# Patient Record
Sex: MALE | Race: WHITE | NOT HISPANIC OR LATINO | Employment: FULL TIME | URBAN - METROPOLITAN AREA
[De-identification: names, ages, dates, MRNs, and addresses within clinical notes are randomized per-mention and may not be internally consistent; named-entity substitution may affect disease eponyms.]

---

## 2024-09-10 ENCOUNTER — OFFICE VISIT (OUTPATIENT)
Dept: URGENT CARE | Age: 34
End: 2024-09-10
Payer: COMMERCIAL

## 2024-09-10 VITALS
HEART RATE: 70 BPM | RESPIRATION RATE: 20 BRPM | WEIGHT: 223 LBS | OXYGEN SATURATION: 100 % | BODY MASS INDEX: 31.22 KG/M2 | HEIGHT: 71 IN | SYSTOLIC BLOOD PRESSURE: 135 MMHG | DIASTOLIC BLOOD PRESSURE: 87 MMHG | TEMPERATURE: 98 F

## 2024-09-10 DIAGNOSIS — H66.004 RECURRENT ACUTE SUPPURATIVE OTITIS MEDIA OF RIGHT EAR WITHOUT SPONTANEOUS RUPTURE OF TYMPANIC MEMBRANE: Primary | ICD-10-CM

## 2024-09-10 RX ORDER — AMOXICILLIN AND CLAVULANATE POTASSIUM 875; 125 MG/1; MG/1
1 TABLET, FILM COATED ORAL 2 TIMES DAILY
Qty: 14 TABLET | Refills: 0 | Status: SHIPPED | OUTPATIENT
Start: 2024-09-10 | End: 2024-09-17

## 2024-09-10 ASSESSMENT — PAIN SCALES - GENERAL: PAINLEVEL: 4

## 2024-09-10 NOTE — PROGRESS NOTES
"Subjective   Patient ID: Omari Son is a 33 y.o. male. They present today with a chief complaint of Earache (R Ear Pain. Sharp pain under r side of chin. Facial numbness R side. Stiff/Painful neck R side. Worsened x 2 d.).    History of Present Illness  This is a pleasant 33-year-old male who presented to the emergency room complaining of recurrent right-sided ear ache.  The patient states he was seen here 1 week ago and was treated with an eardrop without any relief.  He states in the past 2 days been experiencing some tingling sensation over the right side of his face extending to the anterior chin area.  He denies any fever or chills.  Denies any hearing loss.  He denies any drainage from his ear.  He denies any sore throat.  Earache         Past Medical History  Allergies as of 09/10/2024 - Reviewed 09/10/2024   Allergen Reaction Noted    Hydrochlorothiazide Other 09/10/2024       (Not in a hospital admission)       No past medical history on file.    No past surgical history on file.     reports that he has quit smoking. His smoking use included cigarettes. He does not have any smokeless tobacco history on file.    Review of Systems  Review of Systems   HENT:  Positive for ear pain.    All other systems reviewed and are negative.                                 Objective    Vitals:    09/10/24 1242   BP: 135/87   BP Location: Left arm   Patient Position: Sitting   BP Cuff Size: Large adult   Pulse: 70   Resp: 20   Temp: 36.7 °C (98 °F)   TempSrc: Oral   SpO2: 100%   Weight: 101 kg (223 lb)   Height: 1.803 m (5' 11\")     No LMP for male patient.    Physical Exam  General: Vitals Noted. No distress. Normocephalic.    HEENT: Left  TM is within normal limits with adequately infection visible landmarks.  right TM appears erythematous and bulging .  No preauricular tenderness.  No drainage.  EOMI, normal conjunctiva, patent nares, Normal OP            Procedures    Point of Care Test & Imaging Results from this " visit  Results for orders placed or performed in visit on 02/27/20   Lipid Panel   Result Value Ref Range    Cholesterol 149 0 - 199 mg/dL    HDL 30.0 (A) mg/dL    Cholesterol/HDL Ratio 5.0     LDL 67 0 - 99 mg/dL    VLDL 52 (H) 0 - 40 mg/dL    Triglycerides 259 (H) 0 - 149 mg/dL    Non HDL Cholesterol 119 mg/dL   Comprehensive Metabolic Panel   Result Value Ref Range    Glucose 83 74 - 99 mg/dL    Sodium 139 136 - 145 mmol/L    Potassium 3.7 3.5 - 5.3 mmol/L    Chloride 100 98 - 107 mmol/L    Bicarbonate 31 21 - 32 mmol/L    Anion Gap 12 10 - 20 mmol/L    Urea Nitrogen 9 6 - 23 mg/dL    Creatinine 1.04 0.50 - 1.30 mg/dL    GLOMERULAR FILTRATION RATE-NON AFRICAN AMERICAN >60 >60 mL/min/1.73m2    GLOMERULAR FILTRATION RATE- >60 >60 mL/min/1.73m2    Calcium 9.6 8.6 - 10.3 mg/dL    Albumin 4.9 3.4 - 5.0 g/dL    Alkaline Phosphatase 53 33 - 120 U/L    Total Protein 7.4 6.4 - 8.2 g/dL    AST 19 9 - 39 U/L    Total Bilirubin 0.6 0.0 - 1.2 mg/dL    ALT (SGPT) 21 10 - 52 U/L   CBC and Auto Differential   Result Value Ref Range    WBC 4.6 4.4 - 11.3 x10E9/L    RBC 4.92 4.50 - 5.90 x10E12/L    Hemoglobin 14.0 13.5 - 17.5 g/dL    Hematocrit 41.5 41.0 - 52.0 %    MCV 84 80 - 100 fL    MCHC 33.7 32.0 - 36.0 g/dL    Platelets 195 150 - 450 x10E9/L    RDW 13.4 11.5 - 14.5 %    Neutrophils % 58.2 40.0 - 80.0 %    Immature Granulocytes %, Automated 0.2 0.0 - 0.9 %    Lymphocytes % 32.5 13.0 - 44.0 %    Monocytes % 6.7 2.0 - 10.0 %    Eosinophils % 2.0 0.0 - 6.0 %    Basophils % 0.4 0.0 - 2.0 %    Neutrophils Absolute 2.68 1.20 - 7.70 x10E9/L    Lymphocytes Absolute 1.50 1.20 - 4.80 x10E9/L    Monocytes Absolute 0.31 0.10 - 1.00 x10E9/L    Eosinophils Absolute 0.09 0.00 - 0.70 x10E9/L    Basophils Absolute 0.02 0.00 - 0.10 x10E9/L     No results found.    Diagnostic study results (if any) were reviewed by Burt Thompson DO.    Assessment/Plan   Allergies, medications, history, and pertinent labs/EKGs/Imaging  reviewed by Burt Thompson DO.     Medical Decision Making  DM: Patient was seen and evaluated the clinic for chief complaint of ear pain.  On exam patient is nontoxic very well-appearing resting comfortably no acute distress.  Vital signs are stable, afebrile.  Chest is clear, heart is regular, belly is soft and nontender.  Evaluation of left right TM as above concerning for an acute otitis media.  Mastoids are nontender therefore minimal concern for acute mastoiditis.  Minimal concern for acute otitis externa.  No concern for acute sinusitis.  Will treat patient with Augmentin twice daily x 10 days with instruction to follow with her primary care physician in the next week.  Will be discharged home at this time.  Advised Tylenol ibuprofen as needed for pain.  I reviewed my impression, plan, strict return precautions with the patient.  She expresses understanding and agreement plan of care.    Orders and Diagnoses  There are no diagnoses linked to this encounter.    Medical Admin Record      Follow Up Instructions  No follow-ups on file.    Patient disposition: Home    Electronically signed by Burt Thompson DO  12:49 PM

## 2025-08-12 ENCOUNTER — OFFICE VISIT (OUTPATIENT)
Dept: URGENT CARE | Age: 35
End: 2025-08-12
Payer: OTHER GOVERNMENT

## 2025-08-12 VITALS
SYSTOLIC BLOOD PRESSURE: 136 MMHG | BODY MASS INDEX: 30.8 KG/M2 | OXYGEN SATURATION: 100 % | RESPIRATION RATE: 18 BRPM | TEMPERATURE: 98.9 F | HEART RATE: 71 BPM | DIASTOLIC BLOOD PRESSURE: 89 MMHG | WEIGHT: 220 LBS | HEIGHT: 71 IN

## 2025-08-12 DIAGNOSIS — H69.91 EUSTACHIAN TUBE DYSFUNCTION, RIGHT: Primary | ICD-10-CM

## 2025-08-12 DIAGNOSIS — R68.83 CHILLS: ICD-10-CM

## 2025-08-12 DIAGNOSIS — J06.9 VIRAL URI: ICD-10-CM

## 2025-08-12 LAB
POC CORONAVIRUS SARS-COV-2 PCR: NEGATIVE
POC HUMAN RHINOVIRUS PCR: NEGATIVE
POC INFLUENZA A VIRUS PCR: NEGATIVE
POC INFLUENZA B VIRUS PCR: NEGATIVE
POC RESPIRATORY SYNCYTIAL VIRUS PCR: NEGATIVE

## 2025-08-12 PROCEDURE — 87631 RESP VIRUS 3-5 TARGETS: CPT

## 2025-08-12 PROCEDURE — 3008F BODY MASS INDEX DOCD: CPT

## 2025-08-12 PROCEDURE — 99213 OFFICE O/P EST LOW 20 MIN: CPT

## 2025-08-12 RX ORDER — PANTOPRAZOLE SODIUM 20 MG/1
TABLET, DELAYED RELEASE ORAL
COMMUNITY

## 2025-08-12 RX ORDER — PROPRANOLOL HYDROCHLORIDE 10 MG/1
10 TABLET ORAL
COMMUNITY
Start: 2025-01-06

## 2025-08-12 RX ORDER — PANTOPRAZOLE SODIUM 20 MG/1
20 TABLET, DELAYED RELEASE ORAL
COMMUNITY
Start: 2025-07-29